# Patient Record
Sex: MALE | Race: WHITE | NOT HISPANIC OR LATINO | Employment: OTHER | URBAN - METROPOLITAN AREA
[De-identification: names, ages, dates, MRNs, and addresses within clinical notes are randomized per-mention and may not be internally consistent; named-entity substitution may affect disease eponyms.]

---

## 2023-03-31 ENCOUNTER — HOSPITAL ENCOUNTER (EMERGENCY)
Facility: HOSPITAL | Age: 48
Discharge: HOME | End: 2023-03-31
Attending: STUDENT IN AN ORGANIZED HEALTH CARE EDUCATION/TRAINING PROGRAM

## 2023-03-31 VITALS
OXYGEN SATURATION: 97 % | DIASTOLIC BLOOD PRESSURE: 91 MMHG | TEMPERATURE: 98 F | RESPIRATION RATE: 18 BRPM | HEART RATE: 59 BPM | SYSTOLIC BLOOD PRESSURE: 112 MMHG

## 2023-03-31 DIAGNOSIS — S61.315A LACERATION OF LEFT RING FINGER WITHOUT FOREIGN BODY WITH DAMAGE TO NAIL, INITIAL ENCOUNTER: ICD-10-CM

## 2023-03-31 DIAGNOSIS — S61.209A OPEN DISLOCATION OF FINGER, INITIAL ENCOUNTER: ICD-10-CM

## 2023-03-31 DIAGNOSIS — S63.259A OPEN DISLOCATION OF FINGER, INITIAL ENCOUNTER: ICD-10-CM

## 2023-03-31 DIAGNOSIS — M79.642 LEFT HAND PAIN: Primary | ICD-10-CM

## 2023-03-31 LAB
ALBUMIN SERPL-MCNC: 4.4 G/DL (ref 3.5–5)
ALBUMIN/GLOB SERPL: 1.3 RATIO (ref 1.1–2)
ALP SERPL-CCNC: 46 UNIT/L (ref 40–150)
ALT SERPL-CCNC: 26 UNIT/L (ref 0–55)
AST SERPL-CCNC: 33 UNIT/L (ref 5–34)
BASOPHILS # BLD AUTO: 0.04 X10(3)/MCL (ref 0–0.2)
BASOPHILS NFR BLD AUTO: 0.5 %
BILIRUBIN DIRECT+TOT PNL SERPL-MCNC: 0.7 MG/DL
BUN SERPL-MCNC: 11.1 MG/DL (ref 8.9–20.6)
CALCIUM SERPL-MCNC: 9.6 MG/DL (ref 8.4–10.2)
CHLORIDE SERPL-SCNC: 105 MMOL/L (ref 98–107)
CO2 SERPL-SCNC: 25 MMOL/L (ref 22–29)
CREAT SERPL-MCNC: 0.91 MG/DL (ref 0.73–1.18)
EOSINOPHIL # BLD AUTO: 0.02 X10(3)/MCL (ref 0–0.9)
EOSINOPHIL NFR BLD AUTO: 0.3 %
ERYTHROCYTE [DISTWIDTH] IN BLOOD BY AUTOMATED COUNT: 12.3 % (ref 11.5–17)
GFR SERPLBLD CREATININE-BSD FMLA CKD-EPI: >60 MLS/MIN/1.73/M2
GLOBULIN SER-MCNC: 3.3 GM/DL (ref 2.4–3.5)
GLUCOSE SERPL-MCNC: 108 MG/DL (ref 74–100)
HCT VFR BLD AUTO: 39.9 % (ref 42–52)
HGB BLD-MCNC: 13.9 G/DL (ref 14–18)
IMM GRANULOCYTES # BLD AUTO: 0.02 X10(3)/MCL (ref 0–0.04)
IMM GRANULOCYTES NFR BLD AUTO: 0.3 %
LYMPHOCYTES # BLD AUTO: 1.46 X10(3)/MCL (ref 0.6–4.6)
LYMPHOCYTES NFR BLD AUTO: 19.3 %
MCH RBC QN AUTO: 32.2 PG (ref 27–31)
MCHC RBC AUTO-ENTMCNC: 34.8 G/DL (ref 33–36)
MCV RBC AUTO: 92.4 FL (ref 80–94)
MONOCYTES # BLD AUTO: 0.61 X10(3)/MCL (ref 0.1–1.3)
MONOCYTES NFR BLD AUTO: 8.1 %
NEUTROPHILS # BLD AUTO: 5.41 X10(3)/MCL (ref 2.1–9.2)
NEUTROPHILS NFR BLD AUTO: 71.5 %
NRBC BLD AUTO-RTO: 0 %
PLATELET # BLD AUTO: 272 X10(3)/MCL (ref 130–400)
PMV BLD AUTO: 9.8 FL (ref 7.4–10.4)
POTASSIUM SERPL-SCNC: 4.2 MMOL/L (ref 3.5–5.1)
PROT SERPL-MCNC: 7.7 GM/DL (ref 6.4–8.3)
RBC # BLD AUTO: 4.32 X10(6)/MCL (ref 4.7–6.1)
SODIUM SERPL-SCNC: 139 MMOL/L (ref 136–145)
WBC # SPEC AUTO: 7.6 X10(3)/MCL (ref 4.5–11.5)

## 2023-03-31 PROCEDURE — 96375 TX/PRO/DX INJ NEW DRUG ADDON: CPT

## 2023-03-31 PROCEDURE — 25000003 PHARM REV CODE 250: Performed by: STUDENT IN AN ORGANIZED HEALTH CARE EDUCATION/TRAINING PROGRAM

## 2023-03-31 PROCEDURE — 96365 THER/PROPH/DIAG IV INF INIT: CPT

## 2023-03-31 PROCEDURE — 85025 COMPLETE CBC W/AUTO DIFF WBC: CPT | Performed by: STUDENT IN AN ORGANIZED HEALTH CARE EDUCATION/TRAINING PROGRAM

## 2023-03-31 PROCEDURE — 96366 THER/PROPH/DIAG IV INF ADDON: CPT

## 2023-03-31 PROCEDURE — 90715 TDAP VACCINE 7 YRS/> IM: CPT | Performed by: NURSE PRACTITIONER

## 2023-03-31 PROCEDURE — 99284 EMERGENCY DEPT VISIT MOD MDM: CPT | Mod: 25

## 2023-03-31 PROCEDURE — 12042 INTMD RPR N-HF/GENIT2.6-7.5: CPT | Mod: 59

## 2023-03-31 PROCEDURE — 63600175 PHARM REV CODE 636 W HCPCS: Mod: TB,JG | Performed by: NURSE PRACTITIONER

## 2023-03-31 PROCEDURE — 90471 IMMUNIZATION ADMIN: CPT | Performed by: NURSE PRACTITIONER

## 2023-03-31 PROCEDURE — 12002 RPR S/N/AX/GEN/TRNK2.6-7.5CM: CPT

## 2023-03-31 PROCEDURE — 80053 COMPREHEN METABOLIC PANEL: CPT | Performed by: STUDENT IN AN ORGANIZED HEALTH CARE EDUCATION/TRAINING PROGRAM

## 2023-03-31 PROCEDURE — 26770 TREAT FINGER DISLOCATION: CPT | Mod: F3

## 2023-03-31 PROCEDURE — 63600175 PHARM REV CODE 636 W HCPCS: Performed by: NURSE PRACTITIONER

## 2023-03-31 RX ORDER — LIDOCAINE HYDROCHLORIDE 10 MG/ML
10 INJECTION, SOLUTION EPIDURAL; INFILTRATION; INTRACAUDAL; PERINEURAL
Status: COMPLETED | OUTPATIENT
Start: 2023-03-31 | End: 2023-03-31

## 2023-03-31 RX ORDER — HYDROCODONE BITARTRATE AND ACETAMINOPHEN 10; 325 MG/1; MG/1
1 TABLET ORAL EVERY 6 HOURS PRN
Qty: 12 TABLET | Refills: 0 | Status: SHIPPED | OUTPATIENT
Start: 2023-03-31

## 2023-03-31 RX ORDER — CEFAZOLIN SODIUM 2 G/50ML
2 SOLUTION INTRAVENOUS
Status: COMPLETED | OUTPATIENT
Start: 2023-03-31 | End: 2023-03-31

## 2023-03-31 RX ORDER — METHOCARBAMOL 500 MG/1
1000 TABLET, FILM COATED ORAL 3 TIMES DAILY
Qty: 30 TABLET | Refills: 0 | Status: SHIPPED | OUTPATIENT
Start: 2023-03-31 | End: 2023-04-05

## 2023-03-31 RX ORDER — MORPHINE SULFATE 4 MG/ML
4 INJECTION, SOLUTION INTRAMUSCULAR; INTRAVENOUS
Status: COMPLETED | OUTPATIENT
Start: 2023-03-31 | End: 2023-03-31

## 2023-03-31 RX ORDER — ONDANSETRON 4 MG/1
4 TABLET, ORALLY DISINTEGRATING ORAL EVERY 6 HOURS PRN
Qty: 12 TABLET | Refills: 0 | Status: SHIPPED | OUTPATIENT
Start: 2023-03-31

## 2023-03-31 RX ORDER — CEPHALEXIN 500 MG/1
500 CAPSULE ORAL 4 TIMES DAILY
Qty: 20 CAPSULE | Refills: 0 | Status: SHIPPED | OUTPATIENT
Start: 2023-03-31 | End: 2023-04-05

## 2023-03-31 RX ADMIN — TETANUS TOXOID, REDUCED DIPHTHERIA TOXOID AND ACELLULAR PERTUSSIS VACCINE, ADSORBED 0.5 ML: 5; 2.5; 8; 8; 2.5 SUSPENSION INTRAMUSCULAR at 02:03

## 2023-03-31 RX ADMIN — MORPHINE SULFATE 4 MG: 4 INJECTION, SOLUTION INTRAMUSCULAR; INTRAVENOUS at 03:03

## 2023-03-31 RX ADMIN — LIDOCAINE HYDROCHLORIDE 100 MG: 10 INJECTION, SOLUTION EPIDURAL; INFILTRATION; INTRACAUDAL; PERINEURAL at 04:03

## 2023-03-31 RX ADMIN — CEFAZOLIN SODIUM 2 G: 2 SOLUTION INTRAVENOUS at 03:03

## 2023-03-31 NOTE — DISCHARGE INSTRUCTIONS
It is very important that you follow-up with hand surgery as we discussed as he will likely require surgical repair of your injury.  Please immediately return to the emergency department if you experience any fevers or chills, worsening pain, increased bleeding or drainage from the wound, or any other concerns.    Please follow up with your primary care physician in 2-3 days.      Your visit in the emergency department is NOT definitive care - please follow-up with your primary care doctor and/or specialist within 1-2 days.  Please return if you have any worsening in your condition or if you have any other concerns.    If you had radiology exams like an XRAY or CT in the emergency Department the interpretation on them may be preliminary - there may be less time sensitive findings on the reports. Please obtain these reports within 24 hours from the hospital or by using the [facility] for the portal on your mobile phone to access records.  Bring these to your primary care doctor and/or specialist for further review of incidental findings.    Please review any LAB WORK from your visit today with your primary care physician.    Please return to the emergency department if you develop any worsening symptoms, fever, chest pain, difficulty breathing, or any other new symptoms or concerns.      Thank you for allowing us to take care of you today.

## 2023-03-31 NOTE — ED PROVIDER NOTES
Encounter Date: 3/31/2023    SCRIBE #1 NOTE: I, Armond Clarissa, am scribing for, and in the presence of,  Daniel Cain MD. I have scribed the following portions of the note - Other sections scribed: HPI, ROS, PE, MDM.     History     Chief Complaint   Patient presents with    Hand Pain     Left ring/finger pain/laceration after finger got caught by a saw. Unsure of last tetanus injection, dressed in triage, bleeding controlled at this time.      A 46 y/o male presents to [facility] ED after sustaining lacerations to his 4th and 5th fingers from a table saw while cutting plywood at home today.     The history is provided by the patient. No  was used.   Hand Pain  This is a new problem. Episode onset: today. Episode frequency: a single episode. The problem has not changed since onset.Pertinent negatives include no chest pain, no abdominal pain and no shortness of breath. Exacerbated by: palpation and use of the L hand. Nothing relieves the symptoms. He has tried nothing for the symptoms. The treatment provided no relief.         Review of patient's allergies indicates:  No Known Allergies  History reviewed. No pertinent past medical history.  No past surgical history on file.  History reviewed. No pertinent family history.     Review of Systems   Constitutional:  Negative for chills and fever.   HENT:  Negative for drooling and sore throat.    Eyes:  Negative for pain and redness.   Respiratory:  Negative for shortness of breath, wheezing and stridor.    Cardiovascular:  Negative for chest pain, palpitations and leg swelling.   Gastrointestinal:  Negative for abdominal pain, nausea and vomiting.   Genitourinary:  Negative for dysuria and hematuria.   Musculoskeletal:  Negative for back pain, neck pain and neck stiffness.   Skin:  Positive for wound (lacerations to the L 4th and L 5th fingers). Negative for rash.   Neurological:  Negative for weakness and numbness.   Hematological:  Does not  bruise/bleed easily.     Physical Exam     Initial Vitals [03/31/23 1441]   BP Pulse Resp Temp SpO2   135/79 74 18 98.3 °F (36.8 °C) 98 %      MAP       --         Physical Exam    Nursing note and vitals reviewed.  Constitutional: He appears well-developed. He is not diaphoretic. No distress.   HENT:   Head: Normocephalic and atraumatic.   Nose: Nose normal.   Mouth/Throat: Oropharynx is clear and moist.   Eyes: Conjunctivae and EOM are normal. Pupils are equal, round, and reactive to light.   Cardiovascular:  Normal rate and regular rhythm.           No murmur heard.  Pulmonary/Chest: Breath sounds normal. No respiratory distress. He has no wheezes. He has no rales.   Abdominal: Abdomen is soft. He exhibits no distension. There is no abdominal tenderness.     Neurological: He is alert and oriented to person, place, and time. He has normal strength. No cranial nerve deficit.   Skin: Skin is warm. Capillary refill takes less than 2 seconds. No rash noted.   Patient has a laceration to the medial aspect of the L pinky and a deep laceration to the L ring finger with displacement and angulation of the distal phalanx that is hemostatic.        ED Course   Lac Repair    Date/Time: 4/4/2023 10:31 PM  Performed by: Daniel Cain MD  Authorized by: Daniel Cain MD     Consent:     Consent obtained:  Verbal    Consent given by:  Patient    Risks, benefits, and alternatives were discussed: yes      Risks discussed:  Infection, need for additional repair, nerve damage, poor cosmetic result, pain, poor wound healing, retained foreign body, tendon damage and vascular damage    Alternatives discussed:  Observation and referral  Universal protocol:     Patient identity confirmed:  Verbally with patient, arm band and provided demographic data  Anesthesia:     Anesthesia method:  Nerve block    Block location:  Digital    Block needle gauge:  27 G    Block anesthetic:  Lidocaine 1% w/o epi    Block injection  procedure:  Anatomic landmarks identified, anatomic landmarks palpated, introduced needle, negative aspiration for blood and incremental injection    Block outcome:  Anesthesia achieved  Laceration details:     Location:  Finger    Finger location:  L ring finger    Length (cm):  3  Pre-procedure details:     Preparation:  Patient was prepped and draped in usual sterile fashion and imaging obtained to evaluate for foreign bodies  Exploration:     Limited defect created (wound extended): no      Hemostasis achieved with:  Direct pressure    Imaging obtained: x-ray      Imaging outcome: foreign body not noted      Wound exploration: wound explored through full range of motion      Contaminated: yes    Treatment:     Area cleansed with:  Saline    Amount of cleaning:  Extensive    Irrigation method:  Pressure wash and syringe    Visualized foreign bodies/material removed: no      Debridement:  Minimal  Skin repair:     Repair method:  Sutures    Suture size:  4-0    Suture material:  Prolene    Suture technique:  Simple interrupted    Number of sutures:  3  Approximation:     Approximation:  Loose  Repair type:     Repair type:  Complex  Post-procedure details:     Dressing:  Non-adherent dressing, antibiotic ointment and splint for protection    Procedure completion:  Tolerated well, no immediate complications  Orthopedic Injury    Date/Time: 4/4/2023 10:32 PM  Performed by: Daniel Cain MD  Authorized by: Daniel Cain MD     Location procedure was performed:  [facility] EMERGENCY DEPARTMENT  Pre-operative diagnosis:  Phalanx dislocation  Post-operative diagnosis:  Phalanx dislocation  Consent Done?:  Yes  Universal Protocol:     Verbal consent obtained?: Yes      Risks and benefits: Risks, benefits and alternatives were discussed      Consent given by:  Patient    Time Out: Immediately prior to the procedure a time out was called    Injury:     Injury location:  Finger    Location details:  Left ring  finger    Injury type:  Dislocation      Pre-procedure assessment:     Distal perfusion: diminished      Neurological function: diminished      Range of motion: reduced      Local anesthesia used?: Yes      Anesthesia:  Digital block    Local anesthetic:  Lidocaine 1% without epinephrine      Selections made in this section will also lock the Injury type section above.:     Manipulation performed?: Yes      Reduction successful?: Yes      X-ray confirmed reduction: Patient declined.      Immobilization:  Splint    Splint type:  Static finger    Supplies used:  Aluminum splint  Post-procedure assessment:     Distal perfusion: diminished      Neurological function: diminished      Range of motion: splinted      Patient tolerance:  Patient tolerated the procedure well with no immediate complications  Labs Reviewed   COMPREHENSIVE METABOLIC PANEL - Abnormal; Notable for the following components:       Result Value    Glucose Level 108 (*)     All other components within normal limits   CBC WITH DIFFERENTIAL - Abnormal; Notable for the following components:    RBC 4.32 (*)     Hgb 13.9 (*)     Hct 39.9 (*)     MCH 32.2 (*)     All other components within normal limits   CBC W/ AUTO DIFFERENTIAL    Narrative:     The following orders were created for panel order CBC auto differential.  Procedure                               Abnormality         Status                     ---------                               -----------         ------                     CBC with Differential[[id number]]        Abnormal            Final result                 Please view results for these tests on the individual orders.          Imaging Results              X-Ray Hand 3 view Left (Final result)  Result time 03/31/23 15:15:17      Final result by Kd Victor MD (03/31/23 15:15:17)                   Impression:      Dissecting elation of the 4th distal phalanx.  Recommend postreduction images for evaluation of  fractures.      Electronically signed by: Kd Victor  Date:    03/31/2023  Time:    15:15               Narrative:    EXAMINATION:  XR HAND COMPLETE 3 VIEW LEFT    CLINICAL HISTORY:  Lacerations;    TECHNIQUE:  Radiographs of the left hand with AP, lateral and oblique  views.    COMPARISON:  No prior imaging available for comparison    FINDINGS:  Disarticulation of the 4th distal phalanx.                                       Medications   cefazolin (ANCEF) 2 gram in dextrose 5% 50 mL IVPB (premix) (0 g Intravenous Stopped 3/31/23 1543)   Tdap (BOOSTRIX) vaccine injection 0.5 mL (0.5 mLs Intramuscular Given 3/31/23 1445)   morphine injection 4 mg (4 mg Intravenous Given 3/31/23 1503)   LIDOcaine (PF) 10 mg/ml (1%) injection 100 mg (100 mg Infiltration Given 3/31/23 1600)     Medical Decision Making:   Clinical Tests:   Lab Tests: Ordered and Reviewed  Radiological Study: Ordered and Reviewed        Scribe Attestation:   Scribe #1: I performed the above scribed service and the documentation accurately describes the services I performed. I attest to the accuracy of the note.    Attending Attestation:           Physician Attestation for Scribe:  Physician Attestation Statement for Scribe #1: I, Daniel Cain MD, reviewed documentation, as scribed by Armond Romo in my presence, and it is both accurate and complete.           ED Course as of 04/04/23 2234   Fri Mar 31, 2023   1633 Case discussed with Orthopedic surgery.  Recommends reduction, loose approximation, copious irrigation, and splinting with discharge on oral antibiotics and follow up with hand surgery 1st thing Monday morning for likely surgical intervention. [MC]      ED Course User Index  [MC] Daniel Cain MD               Medical Decision Making  Problems Addressed:  Laceration of left ring finger without foreign body with damage to nail, initial encounter: acute illness or injury with systemic symptoms that poses a threat to life  or bodily functions  Left hand pain: acute illness or injury with systemic symptoms        Differential diagnosis (includes but is not limited to):   Laceration, contusion, fracture, dislocation, open fracture, traumatic arthrotomy, tendon injury, neurovascular injury    MDM Narrative  47-year-old male presents for evaluation of left finger pain after catching it with a saw while working.  He denies any trauma anywhere else.  Clinical pictures taken for the chart as above.  X-ray pending to rule out fracture.  Pain and nausea control as needed.  Tetanus and antibiotics to be administered.  Case to be discussed with Orthopedic surgery, appreciate recommendations.    Update:  Case discussed with hand surgery, recommends loose approximation after reduction with copious irrigation and splinting.  Patient to follow-up in clinic for further evaluation and likely surgical intervention.  Wounds loosely reapproximated with sutures after nerve block and reduction.  Patient has another small laceration to his small finger of the left hand, patient has declined suture repair of that finger.  Patient agrees with plan for discharge home with follow-up with hand surgery in clinic.  I have stressed the importance of following up as directed to prevent infection or permanent disability from his likely tendon injury.  Patient has verbalized understanding of need for follow-up and all questions answered at bedside.  Prescriptions for pain and nausea control and provided.  Oral antibiotic prescription provided.  Strict return precautions have been discussed the patient has verbalized understanding.  Instructions for wound care and suture care have been discussed as well.    Dispo:  Discharge    My independent radiology interpretation:  As above  Point of care US (independently performed and interpreted):   Decision rules/clinical scoring:     Amount and/or Complexity of Data Reviewed  Independent historian: none   Summary of history:    External data reviewed: notes from previous admissions, notes from previous ED visits, notes from clinic visits, notes from outside facilities (through [facility]), and prescription medications   Summary of data reviewed:  Prior notes reviewed in the electronic medical record as above  Risk and benefits of testing: discussed   Labs: ordered and reviewed  Radiology: ordered and independent interpretation performed (see above or ED course)  ECG/medicine tests: ordered and independent interpretation performed (see above or ED course)  Discussion of management or test interpretation with external provider(s): discussed with hand surgery consultant   Summary of discussion:  As above    Risk  OTC medications  Prescription drug management   Parenteral controlled substances   Drug therapy requiring intense monitoring for toxicity   Decision regarding hospitalization  Shared decision making     Critical Care  none    Data Reviewed/Counseling: I have personally reviewed the patient's vital signs, nursing notes, and other relevant tests, information, and imaging. I had a detailed discussion regarding the historical points, exam findings, and any diagnostic results supporting the discharge diagnosis. I personally performed the history, PE, MDM and procedures as documented above and agree with the scribe's documentation.    Portions of this note were dictated using voice recognition software. Although it was reviewed for accuracy, some inherent voice recognition errors may have occurred and may be present in this document.   Clinical Impression:   Final diagnoses:  [M79.642] Left hand pain (Primary)  [S61.315A] Laceration of left ring finger without foreign body with damage to nail, initial encounter  [S63.259A, S61.209A] Open dislocation of finger, initial encounter        ED Disposition Condition    Discharge Stable          ED Prescriptions       Medication Sig Dispense Start Date End Date Auth. Provider     HYDROcodone-acetaminophen (NORCO)  mg per tablet Take 1 tablet by mouth every 6 (six) hours as needed for Pain. 12 tablet 3/31/2023 -- Daniel Cain MD    ondansetron (ZOFRAN-ODT) 4 MG TbDL Take 1 tablet (4 mg total) by mouth every 6 (six) hours as needed (Nausea). 12 tablet 3/31/2023 -- Daniel Cain MD    methocarbamoL (ROBAXIN) 500 MG Tab Take 2 tablets (1,000 mg total) by mouth 3 (three) times daily. for 5 days 30 tablet 3/31/2023 4/5/2023 Daniel Cain MD    cephALEXin (KEFLEX) 500 MG capsule Take 1 capsule (500 mg total) by mouth 4 (four) times daily. for 5 days 20 capsule 3/31/2023 4/5/2023 Daniel Cain MD          Follow-up Information       Follow up With Specialties Details Why Contact Info    Sheldon Lara MD Hand Surgery, Orthopedic Surgery Schedule an appointment as soon as possible for a visit   [address] [facility] [address]  [facility] [id number] [phone]      [facility]   If symptoms worsen [address] [facility] [id number] [phone]      [facility] - Emergency Dept Emergency Medicine   [facility] [facility] [address] [phone]             Daniel Cain MD  04/04/23 4014

## 2023-03-31 NOTE — FIRST PROVIDER EVALUATION
Medical screening examination initiated.  I have conducted a focused provider triage encounter, findings are as follows:    Brief history of present illness:  Patient states that he was using a table saw PTA when cut his left 4th and 5th digits.     There were no vitals filed for this visit.    Pertinent physical exam:  Awake, alert, ambulatory      Brief workup plan:  Imaging, Medications    Preliminary workup initiated; this workup will be continued and followed by the physician or advanced practice provider that is assigned to the patient when roomed.

## 2023-04-03 ENCOUNTER — HOSPITAL ENCOUNTER (EMERGENCY)
Facility: HOSPITAL | Age: 48
Discharge: HOME | End: 2023-04-03
Attending: FAMILY MEDICINE

## 2023-04-03 VITALS
BODY MASS INDEX: 21.87 KG/M2 | OXYGEN SATURATION: 98 % | WEIGHT: 165 LBS | RESPIRATION RATE: 20 BRPM | SYSTOLIC BLOOD PRESSURE: 127 MMHG | DIASTOLIC BLOOD PRESSURE: 77 MMHG | HEART RATE: 55 BPM | TEMPERATURE: 98 F | HEIGHT: 73 IN

## 2023-04-03 DIAGNOSIS — Z51.89 ENCOUNTER FOR WOUND RE-CHECK: Primary | ICD-10-CM

## 2023-04-03 DIAGNOSIS — S62.635A: Primary | ICD-10-CM

## 2023-04-03 DIAGNOSIS — S62.639B: Primary | ICD-10-CM

## 2023-04-03 PROCEDURE — 99284 EMERGENCY DEPT VISIT MOD MDM: CPT | Mod: 25

## 2023-04-03 NOTE — ED PROVIDER NOTES
Encounter Date: 4/3/2023       History     Chief Complaint   Patient presents with    Wound Check     Laceration to L hand ring finger on Friday, cut with a saw/ stitched and splinted at . Pt presents today for wound check.      47 year old male presents to ER for wound recheck. He was seen at [facility] on 3/31/23 after he cut his left 4th finger with a table saw. He was sutured and told to f/u with orthopedic surgeon. He states he has no insurance and has not been able to obtain follow up. He did received tetanus and started on antibiotics. He has metal finger splint applied.     The history is provided by the patient. No  was used.   Review of patient's allergies indicates:  No Known Allergies  No past medical history on file.  No past surgical history on file.  No family history on file.     Review of Systems   Constitutional:  Negative for fever.   Gastrointestinal:  Negative for vomiting.   Musculoskeletal:  Positive for arthralgias.   Skin:  Positive for wound.   All other systems reviewed and are negative.    Physical Exam     Initial Vitals [04/03/23 1114]   BP Pulse Resp Temp SpO2   127/77 (!) 55 20 98.3 °F (36.8 °C) 98 %      MAP       --         Physical Exam    Constitutional: He appears well-developed and well-nourished.   HENT:   Head: Normocephalic.   Eyes: EOM are normal.   Neck: Neck supple.   Cardiovascular:  Intact distal pulses.           Pulmonary/Chest: No respiratory distress.   Abdominal: He exhibits no distension.   Musculoskeletal:      Cervical back: Neck supple.     Skin: Capillary refill takes less than 2 seconds. Laceration noted.            ED Course   Procedures  Labs Reviewed - No data to display       Imaging Results    None          Medications   neomycin-bacitracnZn-polymyxnB packet (has no administration in time range)     Medical Decision Making:   Differential Diagnosis:   Post traumatic wound infection, wound recheck  ED Management:  Wound without infection.  Patient currently on Cephalexin and Norco. Wound redressed and finger splint reapplied. Referral to [facility] orthopedic clinic completed and in computer.   Details    Reading Physician Reading Date Result Priority  Kd Victor MD  [phone] 3/31/2023 STAT    Narrative & Impression  EXAMINATION:  XR HAND COMPLETE 3 VIEW LEFT     CLINICAL HISTORY:  Lacerations;     TECHNIQUE:  Radiographs of the left hand with AP, lateral and oblique  views.     COMPARISON:  No prior imaging available for comparison     FINDINGS:  Disarticulation of the 4th distal phalanx.     Impression:     Dissecting elation of the 4th distal phalanx.  Recommend postreduction images for evaluation of fractures.        Electronically signed by: Kd Victor  Date:                                            03/31/2023  Time:                                           15:15                          Clinical Impression:   Final diagnoses:  [Z51.89] Encounter for wound re-check (Primary)        ED Disposition Condition    Discharge Stable          ED Prescriptions    None       Follow-up Information       Follow up With Specialties Details Why Contact Info    [facility]    [address] [facility] [address]  [phone]               JOHN Castellano  04/03/23 1147

## 2023-04-03 NOTE — DISCHARGE INSTRUCTIONS
Keep wound clean and dry  Continue medications prescribed on Friday.   Referral done for [facility] orthopedic clinic

## 2023-04-05 ENCOUNTER — OFFICE VISIT (OUTPATIENT)
Dept: ORTHOPEDICS | Facility: CLINIC | Age: 48
End: 2023-04-05

## 2023-04-05 ENCOUNTER — HOSPITAL ENCOUNTER (OUTPATIENT)
Dept: RADIOLOGY | Facility: HOSPITAL | Age: 48
Discharge: HOME | End: 2023-04-05
Attending: ORTHOPAEDIC SURGERY

## 2023-04-05 VITALS — BODY MASS INDEX: 21.87 KG/M2 | WEIGHT: 165 LBS | HEIGHT: 73 IN

## 2023-04-05 DIAGNOSIS — M79.642 LEFT HAND PAIN: Primary | ICD-10-CM

## 2023-04-05 DIAGNOSIS — S62.639B: ICD-10-CM

## 2023-04-05 DIAGNOSIS — M79.642 LEFT HAND PAIN: ICD-10-CM

## 2023-04-05 DIAGNOSIS — S62.635A: ICD-10-CM

## 2023-04-05 PROCEDURE — 3008F PR BODY MASS INDEX (BMI) DOCUMENTED: ICD-10-PCS | Mod: CPTII,,, | Performed by: ORTHOPAEDIC SURGERY

## 2023-04-05 PROCEDURE — 99499 UNLISTED E&M SERVICE: CPT | Mod: S$PBB,,, | Performed by: ORTHOPAEDIC SURGERY

## 2023-04-05 PROCEDURE — 99499 NO LOS: ICD-10-PCS | Mod: S$PBB,,, | Performed by: ORTHOPAEDIC SURGERY

## 2023-04-05 PROCEDURE — 1160F PR REVIEW ALL MEDS BY PRESCRIBER/CLIN PHARMACIST DOCUMENTED: ICD-10-PCS | Mod: CPTII,,, | Performed by: ORTHOPAEDIC SURGERY

## 2023-04-05 PROCEDURE — 1159F MED LIST DOCD IN RCRD: CPT | Mod: CPTII,,, | Performed by: ORTHOPAEDIC SURGERY

## 2023-04-05 PROCEDURE — 1159F PR MEDICATION LIST DOCUMENTED IN MEDICAL RECORD: ICD-10-PCS | Mod: CPTII,,, | Performed by: ORTHOPAEDIC SURGERY

## 2023-04-05 PROCEDURE — 73130 X-RAY EXAM OF HAND: CPT | Mod: TC,LT

## 2023-04-05 PROCEDURE — 3008F BODY MASS INDEX DOCD: CPT | Mod: CPTII,,, | Performed by: ORTHOPAEDIC SURGERY

## 2023-04-05 PROCEDURE — 99213 OFFICE O/P EST LOW 20 MIN: CPT | Mod: PBBFAC

## 2023-04-05 PROCEDURE — 1160F RVW MEDS BY RX/DR IN RCRD: CPT | Mod: CPTII,,, | Performed by: ORTHOPAEDIC SURGERY

## 2023-04-05 NOTE — PROGRESS NOTES
[facility] Orthopaedic Surgery H&P Note      HPI:  47-year-old male that we saw injury on 03/31/2023 the cause a fracture dislocation of the PIP joint of the left ring finger.  He was washed out and closed in the ED.  Has been on antibiotics for 7 days and in a finger splint.  PMH: No past medical history on file.  PSH: No past surgical history on file.  SH:   Social History     Socioeconomic History    Marital status:    Tobacco Use    Smoking status: Never    Smokeless tobacco: Never     FH: No family history on file.  Allergies: Review of patient's allergies indicates:  No Known Allergies  ROS:  Constitutional- no fever, fatigue, weakness  Eye- no vision loss, eye redness, drainage, or pain  ENMT- no sore throat, ear pain, sinus pain/congestion, nasal congestion/drainage  Respiratory- no cough, wheezing, or shortness of breath  CV- no chest pain, no palpitations, no edema  GI- no N/V/D; no abdominal pain    Physical Exam:  There were no vitals filed for this visit.  General: NAD  Cardio: RRR by peripheral pulse  Pulm: Normal WOB on room air, symmetric chest rise  Abd: Soft, NT/ND  MSK:  Reduced and no displacement evaluation of left ring finger shows a laceration along the ulnar side of the the DIP joint appears to be healing well there were some Prolene sutures in place no sign of drainage or infection at this point does have some numbness on both the radial and ulnar side distal to the laceration  Imaging:   Evaluation of left ring finger shows joint of the DIP is now reduced this.  I will fracture of the distal portion of the P2 as well and wall fracture at the ulnar side of the base of the distal phalanx overall the joint is well  Assessment/Plan:  47-year-old male table saw injury over the left ring finger D IP joint    -at this point we discussed operative irrigation debridement with pinning versus continued monitoring non operative management patient this time like to proceed with non operative management    -we will keep a close eye on the patient see him back in 1 week for re-evaluation and at that time consider removing sutures  -continue finger splint immobilizing the DIP  -follow-up    Joe Vu MD  [facility]

## 2023-04-05 NOTE — PROGRESS NOTES
Faculty Attestation: Cole Coleman  was seen at [facility] in the Orthopaedic Clinic. Discussed with the resident at the time of the visit. History of Present Illness, Physical Exam, and Assessment and Plan reviewed. Treatment plan is reasonable and appropriate. Compliance with treatment recommendations is important. No procedure was performed.     Jm Garcia MD  Orthopaedic Surgery

## 2023-04-12 ENCOUNTER — OFFICE VISIT (OUTPATIENT)
Dept: ORTHOPEDICS | Facility: CLINIC | Age: 48
End: 2023-04-12

## 2023-04-12 DIAGNOSIS — S62.639B: Primary | ICD-10-CM

## 2023-04-12 PROCEDURE — 1159F PR MEDICATION LIST DOCUMENTED IN MEDICAL RECORD: ICD-10-PCS | Mod: CPTII,,, | Performed by: SPECIALIST

## 2023-04-12 PROCEDURE — 99212 OFFICE O/P EST SF 10 MIN: CPT | Mod: PBBFAC

## 2023-04-12 PROCEDURE — 1160F RVW MEDS BY RX/DR IN RCRD: CPT | Mod: CPTII,,, | Performed by: SPECIALIST

## 2023-04-12 PROCEDURE — 1159F MED LIST DOCD IN RCRD: CPT | Mod: CPTII,,, | Performed by: SPECIALIST

## 2023-04-12 PROCEDURE — 99213 PR OFFICE/OUTPT VISIT, EST, LEVL III, 20-29 MIN: ICD-10-PCS | Mod: S$PBB,,, | Performed by: SPECIALIST

## 2023-04-12 PROCEDURE — 1160F PR REVIEW ALL MEDS BY PRESCRIBER/CLIN PHARMACIST DOCUMENTED: ICD-10-PCS | Mod: CPTII,,, | Performed by: SPECIALIST

## 2023-04-12 PROCEDURE — 99213 OFFICE O/P EST LOW 20 MIN: CPT | Mod: S$PBB,,, | Performed by: SPECIALIST

## 2023-04-12 NOTE — PROGRESS NOTES
Faculty Attestation: Cole Coleman  was seen at [facility] in the Orthopaedic Clinic. Discussed with the resident at the time of the visit. History of Present Illness, Physical Exam, and Assessment and Plan reviewed. Treatment plan is reasonable and appropriate. Compliance with treatment recommendations is important. Discussed with the resident at the time of the visit.  No procedure was performed.     Greg Moy MD MD  Orthopaedic Surgery

## 2023-04-12 NOTE — PROGRESS NOTES
[facility] H&P Note      HPI:  47-year-old male that we saw injury on 03/31/2023 the cause a fracture dislocation of the PIP joint of the left ring finger.  He was washed out and closed in the ED.  Has been on antibiotics for 7 days and in a finger splint.    Today:  He has been compliant with splint.  No new issues.  PMH: History reviewed. No pertinent past medical history.  PSH: History reviewed. No pertinent surgical history.  SH:   Social History     Socioeconomic History    Marital status:    Tobacco Use    Smoking status: Never    Smokeless tobacco: Never     FH: History reviewed. No pertinent family history.  Allergies: Review of patient's allergies indicates:  No Known Allergies  ROS:  Constitutional- no fever, fatigue, weakness  Eye- no vision loss, eye redness, drainage, or pain  ENMT- no sore throat, ear pain, sinus pain/congestion, nasal congestion/drainage  Respiratory- no cough, wheezing, or shortness of breath  CV- no chest pain, no palpitations, no edema  GI- no N/V/D; no abdominal pain    Physical Exam:  There were no vitals filed for this visit.  General: NAD  Cardio: RRR by peripheral pulse  Pulm: Normal WOB on room air, symmetric chest rise  Abd: Soft, NT/ND  MSK:  Reduced and no displacement evaluation of left ring finger shows a laceration along the ulnar side of the the DIP joint appears to be healing well there were some Prolene sutures in place no sign of drainage or infection at this point does have some numbness on both the radial and ulnar side distal to the laceration, appears to be healing well, does have a little bit of laxity with varus stress of the PIP joint but not over stress on exam today  Imaging:   Previous imaging Evaluation of left ring finger shows joint of the DIP is now reduced this.  I will fracture of the distal portion of the P2 as well and wall fracture at the ulnar side of the base of the distal phalanx overall the joint is  well  Assessment/Plan:  47-year-old male table saw injury over the left ring finger D IP joint    -appears to be healing well will remove sutures today would still keep it dry except shower and then covered up with me in a splint to just immobilize the D IP to try to allow any damage to the collateral ligaments   -kneed in the summer working on range of motion of the PIP and MCP is finger  -will see him back in 2 weeks with repeat x-rays check scan and re-evaluate collateral ligaments    Joe Vu MD  [facility]